# Patient Record
Sex: FEMALE | Race: AMERICAN INDIAN OR ALASKA NATIVE | HISPANIC OR LATINO | URBAN - METROPOLITAN AREA
[De-identification: names, ages, dates, MRNs, and addresses within clinical notes are randomized per-mention and may not be internally consistent; named-entity substitution may affect disease eponyms.]

---
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
## 2025-06-25 ENCOUNTER — APPOINTMENT (OUTPATIENT)
Dept: URBAN - METROPOLITAN AREA CLINIC 43 | Facility: CLINIC | Age: 20
Setting detail: DERMATOLOGY
End: 2025-06-25

## 2025-06-25 DIAGNOSIS — K13.0 DISEASES OF LIPS: ICD-10-CM

## 2025-06-25 DIAGNOSIS — B07.8 OTHER VIRAL WARTS: ICD-10-CM

## 2025-06-25 PROCEDURE — ? LIQUID NITROGEN

## 2025-06-25 PROCEDURE — ? PRESCRIPTION

## 2025-06-25 PROCEDURE — ? TREATMENT REGIMEN

## 2025-06-25 PROCEDURE — ? ADDITIONAL NOTES

## 2025-06-25 PROCEDURE — ? COUNSELING

## 2025-06-25 RX ORDER — FLUOROURACIL 2 G/40G
CREAM TOPICAL QHS
Qty: 40 | Refills: 1 | Status: ACTIVE

## 2025-06-25 RX ORDER — HYDROCORTISONE 25 MG/G
OINTMENT TOPICAL
Qty: 20 | Refills: 0 | Status: ACTIVE

## 2025-06-25 RX ADMIN — HYDROCORTISONE: 25 OINTMENT TOPICAL at 00:00

## 2025-06-25 RX ADMIN — FLUOROURACIL: 2 CREAM TOPICAL at 00:00

## 2025-06-25 ASSESSMENT — LOCATION ZONE DERM
LOCATION ZONE: LIP
LOCATION ZONE: ARM
LOCATION ZONE: LEG
LOCATION ZONE: FINGER

## 2025-06-25 ASSESSMENT — LOCATION SIMPLE DESCRIPTION DERM
LOCATION SIMPLE: RIGHT THUMB
LOCATION SIMPLE: LEFT THUMB
LOCATION SIMPLE: LEFT MIDDLE FINGER
LOCATION SIMPLE: RIGHT LIP
LOCATION SIMPLE: LEFT ELBOW
LOCATION SIMPLE: RIGHT KNEE

## 2025-06-25 ASSESSMENT — LOCATION DETAILED DESCRIPTION DERM
LOCATION DETAILED: LEFT ELBOW
LOCATION DETAILED: PERIUNGUAL SKIN LEFT THUMB
LOCATION DETAILED: RIGHT INFERIOR VERMILION LIP
LOCATION DETAILED: LEFT DISTAL RADIAL THUMB
LOCATION DETAILED: LEFT MIDDLE FINGERTIP
LOCATION DETAILED: RIGHT KNEE
LOCATION DETAILED: LEFT DISTAL ULNAR THUMB
LOCATION DETAILED: PERIUNGUAL SKIN RIGHT THUMB

## 2025-06-25 NOTE — PROCEDURE: TREATMENT REGIMEN
Initiate Treatment: Efudex 5 % topical cream \\nSig: Apply thin layer of cream to wart on the hand nightly. Cover with bandaid; can wash off in the morning. Pair it down with a pumice stone or [place] board. be careful toxic to dogs. Pause if too irritating.Stop using 4-5 days before next appointment.
Detail Level: Zone
Initiate Treatment: hydrocortisone 2.5 % topical ointment \\nSig: Apply to affected areas of the lip twice daily up to 2 weeks. Repeat for flares.
Otc Regimen: Vaseline

## 2025-06-25 NOTE — PROCEDURE: LIQUID NITROGEN
Medical Necessity Information: It is in your best interest to select a reason for this procedure from the list below. All of these items fulfill various CMS LCD requirements except the new and changing color options.
Render Post Care In The Note?: yes
Post-Care Instructions: I reviewed with the patient in detail post-care instructions. Patient is to wear sunprotection, and avoid picking at any of the treated lesions. Pt may apply Vaseline to crusted or scabbing areas.
Spray Paint Technique: No
Consent: The patient's consent was obtained including but not limited to risks of crusting, scabbing, blistering, scarring, darker or lighter pigmentary change, recurrence, incomplete removal and infection.
Detail Level: Zone
Medical Necessity Clause: This procedure was medically necessary because the lesions that were treated were:
Duration Of Freeze Thaw-Cycle (Seconds): 0
Spray Paint Text: The liquid nitrogen was applied to the skin utilizing a spray paint frosting technique.
Total Number Of Lesions Treated: 5

## 2025-06-25 NOTE — HPI: OTHER
Condition:: Cold sore
Please Describe Your Condition:: Present for  1 month, but now it’s currently resolving.

## 2025-06-25 NOTE — HPI: WARTS ([id number])
Is This A New Presentation, Or A Follow-Up?: Warts
How Severe Are Your Warts?: mild
Additional History: Pt reports warts on hand have been present for a very long time. Warts on knees possibly a couple of months. Pt reports using compound cream a year ago.

## 2025-06-25 NOTE — PROCEDURE: ADDITIONAL NOTES
Additional Notes: Discussed nature of condition and treatment options with efudex vs salicylic acid \\nDiscussed R/B/SE +proper use of medication. \\Eber questions answered. \\nStart treatment :\\n\\nCompound W \\nApply thin layer to wart daily. Cover with a bandaid wash of in the evening. Stop using 4-5 days before next appointment.\\n\\n\\nEfudex 5 % topical cream \\nSig: Apply thin layer of cream to wart on the hand nightly. Cover with bandaid; can wash off in the morning. Pair it down with a pumice stone or [place] board. be careful toxic to dogs. Pause if too irritating.Stop using 4-5 days before next appointment.\\n\\nWritten instructions provided for pt. \\nPt was also informed of tandem for PA’s. \\nF/u 4 weeks.
Detail Level: Simple
Render Risk Assessment In Note?: no
Additional Notes: Discussed nature of condition and treatment options with topical steroids. \\nDiscussed R/B/SE +proper use of medication. \\Eber questions answered. \\nStart treatment\\n\\nhydrocortisone 2.5 % topical ointment \\nSig: Apply to affected areas of the lip twice daily up to 2 weeks. Repeat for flares.\\n\\nRecommend the following Over-The-Counter treatment(s): Vaseline.